# Patient Record
Sex: MALE | Race: WHITE | Employment: FULL TIME | URBAN - METROPOLITAN AREA
[De-identification: names, ages, dates, MRNs, and addresses within clinical notes are randomized per-mention and may not be internally consistent; named-entity substitution may affect disease eponyms.]

---

## 2017-08-20 ENCOUNTER — HOSPITAL ENCOUNTER (EMERGENCY)
Age: 22
Discharge: HOME | End: 2017-08-20
Attending: INTERNAL MEDICINE
Payer: OTHER GOVERNMENT

## 2017-08-20 ENCOUNTER — APPOINTMENT (OUTPATIENT)
Dept: GENERAL RADIOLOGY | Age: 22
End: 2017-08-20
Attending: PHYSICIAN ASSISTANT
Payer: OTHER GOVERNMENT

## 2017-08-20 VITALS
SYSTOLIC BLOOD PRESSURE: 125 MMHG | BODY MASS INDEX: 19.88 KG/M2 | HEIGHT: 73 IN | TEMPERATURE: 98 F | DIASTOLIC BLOOD PRESSURE: 80 MMHG | OXYGEN SATURATION: 96 % | HEART RATE: 74 BPM | WEIGHT: 150 LBS | RESPIRATION RATE: 16 BRPM

## 2017-08-20 DIAGNOSIS — S61.212A LACERATION OF RIGHT MIDDLE FINGER W/O FOREIGN BODY W/O DAMAGE TO NAIL, INITIAL ENCOUNTER: Primary | ICD-10-CM

## 2017-08-20 PROCEDURE — 77030031132 HC SUT NYL COVD -A

## 2017-08-20 PROCEDURE — 99282 EMERGENCY DEPT VISIT SF MDM: CPT

## 2017-08-20 PROCEDURE — 73140 X-RAY EXAM OF FINGER(S): CPT

## 2017-08-20 PROCEDURE — 77030018836 HC SOL IRR NACL ICUM -A

## 2017-08-20 PROCEDURE — 75810000293 HC SIMP/SUPERF WND  RPR

## 2017-08-20 RX ORDER — IBUPROFEN 600 MG/1
600 TABLET ORAL
Qty: 20 TAB | Refills: 0 | Status: SHIPPED | OUTPATIENT
Start: 2017-08-20

## 2017-08-20 NOTE — ED PROVIDER NOTES
HPI Comments: 10:42 AM  Gail Roy is a 25 y.o. male presenting to the ED C/O right hand, third digit laceration onset 11 hours ago. States he cut the finger while trying to fix the trunk of his car. Pt reports cleaning of laceration with water. Pt does not know when his last tetanus shot was but believes it was within the last 5 years but not sure. Bleeding intermittent since injury. Pt denies and any other symptoms or complaints at this time. The history is provided by the patient. No  was used. History reviewed. No pertinent past medical history. History reviewed. No pertinent surgical history. History reviewed. No pertinent family history. Social History     Social History    Marital status: SINGLE     Spouse name: N/A    Number of children: N/A    Years of education: N/A     Occupational History    Not on file. Social History Main Topics    Smoking status: Not on file    Smokeless tobacco: Not on file    Alcohol use Not on file    Drug use: Not on file    Sexual activity: Not on file     Other Topics Concern    Not on file     Social History Narrative    No narrative on file         ALLERGIES: Review of patient's allergies indicates no known allergies. Review of Systems   Constitutional: Negative for activity change. Musculoskeletal: Negative for arthralgias and joint swelling. Skin: Positive for wound (right hand third digit laceration). Neurological: Negative for weakness and numbness. Hematological: Does not bruise/bleed easily. Vitals:    08/20/17 1042   BP: 125/80   Pulse: 74   Resp: 16   Temp: 98 °F (36.7 °C)   SpO2: 96%   Weight: 68 kg (150 lb)   Height: 6' 1\" (1.854 m)            Physical Exam   Constitutional: He appears well-developed and well-nourished. No distress.  male in NAD. Alert. Appears comfortable. HENT:   Head: Normocephalic and atraumatic.    Cardiovascular: Normal rate, regular rhythm, normal heart sounds and intact distal pulses. Pulmonary/Chest: Effort normal and breath sounds normal. No respiratory distress. He has no wheezes. He has no rales. Musculoskeletal:        Right hand: He exhibits tenderness and laceration. He exhibits normal range of motion and normal capillary refill. Normal sensation noted. Normal strength noted. Hands:  Skin: He is not diaphoretic. Nursing note and vitals reviewed. RESULTS:    PULSE OXIMETRY NOTE:  Pulse-ox is 100% on RA. XR 3RD FINGER RT MIN 2 V    (Results Pending)      11:20 AM  RADIOLOGY FINDINGS  3RD finger RT X-ray shows soft tissue defect of the ulnar aspect of right 3RD finger. No radiopaque, foreign body or osseous involvement. Pending review by Radiologist  Recorded by Negin Montelongo ED Scribe, as dictated by Ashley Goldstein PA-C      Labs Reviewed - No data to display    No results found for this or any previous visit (from the past 12 hour(s)). MDM  Number of Diagnoses or Management Options  Diagnosis management comments: Laceration to R third finger. NVI. Will image and repair. Update Tdap       Amount and/or Complexity of Data Reviewed  Tests in the radiology section of CPT®: ordered and reviewed (XR 3RD Finger RT)  Independent visualization of images, tracings, or specimens: yes (XR 3RD Finger RT)      ED Course     MEDICATIONS GIVEN:  Medications   diph,Pertuss(AC),Tet Vac-PF (BOOSTRIX) suspension 0.5 mL (not administered)       Wound Repair  Date/Time: 8/20/2017 10:54 AM  Performed by: [id number] provider: Dr. Davin Ram  Preparation: sterile field established  Pre-procedure re-eval: Immediately prior to the procedure, the patient was reevaluated and found suitable for the planned procedure and any planned medications.   Location details: right long finger  Wound length:2.5 cm or less  Anesthesia: digital block    Anesthesia:  Local Anesthetic: lidocaine 1% without epinephrine  Anesthetic total: 5 mL  Foreign bodies: no foreign bodies  Irrigation solution: saline  Irrigation method: syringe  Debridement: none  Skin closure: 5-0 nylon  Number of sutures: 3  Technique: simple  Approximation: close  Dressing: 4x4  Patient tolerance: Patient tolerated the procedure well with no immediate complications  My total time at bedside, performing this procedure was 1-15 minutes. PROGRESS NOTE:  10:42 AM  Initial assessment performed. Recorded by Kasia Nunez ED Scribe, as dictated by Filipe Allen PA-C. Successful suture repair. NVI with FROM. 7 days for removal. Reviewed proper wound care. Reasons to RTED discussed with pt. All questions answered. Pt feels comfortable going home at this time. Pt expressed understanding and she agrees with plan. Discharge Note:  11:54 AM  Raúl Marcano results have been reviewed with him and/or his family. He has been counseled regarding his diagnosis, treatment, and plan. He verbally conveys understanding and agreement of the signs, symptoms, diagnosis, treatment and prognosis and additionally agrees to follow up as discussed. He also agrees with the care-plan and conveys that all of his questions have been answered. I have also provided discharge instructions for him that include: educational information regarding the diagnosis and treatment, and a list of reasons why He would want to return to the ED prior to his follow-up appointment, should his condition change. Proper ED utilization discussed with the patient. CLINICAL IMPRESSION    1. Laceration of right middle finger w/o foreign body w/o damage to nail, initial encounter        After visit plan:  D/c home    There are no discharge medications for this patient.       Follow-up Information     Follow up With Details Comments Contact Info    [facility] 7 [facility] For suture removal [address] [facility]    [address] in 7 days For primary care follow up and suture removal [address] [facility] [address]  [phone]          This note is prepared by Lupillo Handley, acting as Scribe for Osorio Campos PA-C. Osorio Campos PA-C: The scribe's documentation has been prepared under my direction and personally reviewed by me in its entirety. I confirm that the note above accurately reflects all work, treatment, procedures, and medical decision making performed by me.

## 2017-08-20 NOTE — ED TRIAGE NOTES
Pt states cut his finger right middle finger trying to fix the trunk on his car  Occurred approx 12 Midnight last night

## 2018-01-28 ENCOUNTER — HOSPITAL ENCOUNTER (EMERGENCY)
Age: 23
Discharge: HOME | End: 2018-01-28
Attending: EMERGENCY MEDICINE | Admitting: EMERGENCY MEDICINE
Payer: OTHER GOVERNMENT

## 2018-01-28 VITALS
RESPIRATION RATE: 16 BRPM | BODY MASS INDEX: 22.53 KG/M2 | HEART RATE: 67 BPM | DIASTOLIC BLOOD PRESSURE: 66 MMHG | OXYGEN SATURATION: 100 % | SYSTOLIC BLOOD PRESSURE: 144 MMHG | WEIGHT: 170 LBS | HEIGHT: 73 IN | TEMPERATURE: 97.7 F

## 2018-01-28 DIAGNOSIS — B35.6 TINEA CRURIS: Primary | ICD-10-CM

## 2018-01-28 PROCEDURE — 99281 EMR DPT VST MAYX REQ PHY/QHP: CPT

## 2018-01-28 PROCEDURE — 86592 SYPHILIS TEST NON-TREP QUAL: CPT | Performed by: PHYSICIAN ASSISTANT

## 2018-01-28 RX ORDER — PRENATAL VIT 91/IRON/FOLIC/DHA 28-975-200
COMBINATION PACKAGE (EA) ORAL 2 TIMES DAILY
Qty: 30 G | Refills: 0 | Status: SHIPPED | OUTPATIENT
Start: 2018-01-28

## 2018-01-28 NOTE — ED PROVIDER NOTES
EMERGENCY DEPARTMENT HISTORY AND PHYSICAL EXAM    Date: 1/28/2018  Patient Name: Fawn Crabtree    History of Presenting Illness     Chief Complaint   Patient presents with    Rash         History Provided By: Patient    Chief Complaint: rash  Duration: 1 Weeks  Timing:  Constant  Location: right groin and thigh area  Quality: Burning  Severity: Moderate  Modifying Factors: none  Associated Symptoms: denies any other associated signs or symptoms    Additional History (Context):   2:44 PM   Fawn Crabtree is a 23 y.o. male with no significant PMHX who presents to the emergency department C/O rash. No associates sxs. Pt reports he has had a rash to his right groin area and right thigh for 1 week now. Endorses new sexual partners. PMHx includes herniated disc in lower back. Pt denies possibility of princess something, penile discharge, penile pain, testicular pain, scrotal swelling, and any other sxs or complaints. PCP: Lindsey Hadley MD    Current Outpatient Prescriptions   Medication Sig Dispense Refill    terbinafine HCl (LAMISIL) 1 % topical cream Apply  to affected area two (2) times a day. Apply thin film to area. May take 3-4 weeks for complete resolution 30 g 0    ibuprofen (MOTRIN) 600 mg tablet Take 1 Tab by mouth every six (6) hours as needed for Pain. Take with food. 20 Tab 0       Past History     Past Medical History:  Past Medical History:   Diagnosis Date    Ill-defined condition     herniated disc in lower back       Past Surgical History:  History reviewed. No pertinent surgical history. Family History:  History reviewed. No pertinent family history. Social History:  Social History   Substance Use Topics    Smoking status: Current Every Day Smoker     Packs/day: 1.00    Smokeless tobacco: Never Used    Alcohol use Yes       Allergies:  No Known Allergies      Review of Systems   Review of Systems   Constitutional: Negative for chills and fever.    Gastrointestinal: Negative for nausea and vomiting. Genitourinary: Negative for dysuria, flank pain and frequency. Skin: Positive for rash (right groin and thigh area). Neurological: Negative for light-headedness. Hematological: Negative for adenopathy. All other systems reviewed and are negative. Physical Exam     Vitals:    01/28/18 1430   BP: 144/66   Pulse: 67   Resp: 16   Temp: 97.7 °F (36.5 °C)   SpO2: 100%   Weight: 77.1 kg (170 lb)   Height: 6' 1\" (1.854 m)     Physical Exam   Constitutional: He is oriented to person, place, and time. He appears well-developed and well-nourished. No distress.  male in NAD. Alert. Appears comfortable. HENT:   Head: Normocephalic and atraumatic. Right Ear: External ear normal.   Left Ear: External ear normal.   Nose: Nose normal.   Mouth/Throat: No oral lesions. Eyes: Conjunctivae are normal. Right eye exhibits no discharge. Left eye exhibits no discharge. Neck: Normal range of motion. Cardiovascular: Normal rate, regular rhythm, normal heart sounds and intact distal pulses. Exam reveals no gallop and no friction rub. No murmur heard. Pulmonary/Chest: Effort normal and breath sounds normal. No accessory muscle usage. No tachypnea. No respiratory distress. He has no decreased breath sounds. He has no wheezes. He has no rhonchi. He has no rales. Musculoskeletal: Normal range of motion. Neurological: He is alert and oriented to person, place, and time. Skin: Skin is warm and dry. He is not diaphoretic. There is erythema. Psychiatric: He has a normal mood and affect. Judgment normal.   Nursing note and vitals reviewed. Diagnostic Study Results     Labs -   No results found for this or any previous visit (from the past 12 hour(s)).     Radiologic Studies -   No orders to display     CT Results  (Last 48 hours)    None        CXR Results  (Last 48 hours)    None          Medications given in the ED-  Medications - No data to display      Medical Decision Making I am the first provider for this patient. I reviewed the vital signs, available nursing notes, past medical history, past surgical history, family history and social history. Vital Signs-Reviewed the patient's vital signs. Pulse Oximetry Analysis - 100% on RA     Records Reviewed: Nursing Notes    Provider Notes (Medical Decision Making): tinea, dermatitis, syphilis. Doubt HSV or HPV    Procedures:  Procedures    ED Course:   2:44 PM  Initial assessment performed. The patients presenting problems have been discussed, and they are in agreement with the care plan formulated and outlined with them. I have encouraged them to ask questions as they arise throughout their visit. Diagnosis and Disposition     Suspect tinea. Will await RPR. Reasons to RTED discussed with pt. All questions answered. Pt feels comfortable going home at this time. Pt expressed understanding and he agrees with plan. DISCHARGE NOTE:  2:53 PM  Jsjaymearisteo De Andamarcus  results have been reviewed with him. He has been counseled regarding his diagnosis, treatment, and plan. He verbally conveys understanding and agreement of the signs, symptoms, diagnosis, treatment and prognosis and additionally agrees to follow up as discussed. He also agrees with the care-plan and conveys that all of his questions have been answered. I have also provided discharge instructions for him that include: educational information regarding their diagnosis and treatment, and list of reasons why they would want to return to the ED prior to their follow-up appointment, should his condition change. He has been provided with education for proper emergency department utilization. CLINICAL IMPRESSION:    1. Tinea cruris        PLAN:  1. D/C Home  2. Discharge Medication List as of 1/28/2018  2:54 PM      START taking these medications    Details   terbinafine HCl (LAMISIL) 1 % topical cream Apply  to affected area two (2) times a day. Apply thin film to area. May take 3-4 weeks for complete resolution, Print, Disp-30 g, R-0         CONTINUE these medications which have NOT CHANGED    Details   ibuprofen (MOTRIN) 600 mg tablet Take 1 Tab by mouth every six (6) hours as needed for Pain. Take with food. , Print, Disp-20 Tab, R-0           3. Follow-up Information     Follow up With Details Comments Contact Info    Nelson Flanagan MD Schedule an appointment as soon as possible for a visit in 2 days For follow up with [facility] EMERGENCY DEPT Go to As needed, if symptoms worsen [address] [address]  [phone]        _______________________________    SCRIBE ATTESTATION:  This note is prepared by Arcadio Hamilton, acting as Scribe for Seth Cronin PA-C.    PROVIDER ATTESTATION:  Seth Cronin PA-C: The scribe's documentation has been prepared under my direction and personally reviewed by me in its entirety.  I confirm that the note above accurately reflects all work, treatment, procedures, and medical decision making performed by me.      _______________________________

## 2018-01-28 NOTE — ED TRIAGE NOTES
Amb into ed w/ reports painful rash to groin area now x 1 week - non itchy. Denies any penile discharge - reports a handful of non protected new sexual partners recently.

## 2018-01-28 NOTE — DISCHARGE INSTRUCTIONS
Jock Itch: Care Instructions  Your Care Instructions  Jock itch is a fungal infection of the groin. The fungus that causes jock itch lives on your skin. It often affects male athletes, but anyone can get jock itch. You may get an itchy rash on your inner thighs and rear end (buttocks). It spreads and starts to itch when you sweat or are in steamy showers or locker rooms. Jock itch should end soon if you keep your skin dry after you clean it. You can treat jock itch at home with antifungal creams and powders that you can buy without a prescription. Follow-up care is a key part of your treatment and safety. Be sure to make and go to all appointments, and call your doctor if you are having problems. It's also a good idea to know your test results and keep a list of the medicines you take. How can you care for yourself at home? · Wash the rash with soap and water. · Wet a soft cloth with cool water alone or mixed with baking soda or essential oils such as lavender or eliazar. Put the cool compress on the skin to relieve itching. · If you have large areas of blisters or sores, use compresses such as those made with Burow's solution (available without a prescription) to soothe and dry out the blisters. · Spread antifungal cream or powder over, and beyond the edge of, the rash. Follow the directions on the package. · If your doctor prescribed medicine, take it exactly as directed. Call your doctor if you have any problems with your medicine. · Try not to scratch the rash. · Shower or bathe daily and after you exercise. · Keep your skin dry as much as possible to allow it to heal.  · Until your jock itch is cured, wear loose-fitting cotton clothing. Avoid tight underwear, pants, and panty hose. · Wash your supporters and shorts after every wearing. · Do not share clothing, sports equipment, towels, or sheets to avoid spreading the fungi to other people.   To prevent jock itch  · Put on socks before you put on underwear if you have athlete's foot. This action helps prevent the fungus on your feet from spreading to your groin. · Wash your workout clothes, underwear, socks, and towels after each use. · Keep your groin, inner thighs, and buttocks clean and dry, especially after you exercise and shower. · Use a powder on your groin, inner thighs, and buttocks to help keep these areas dry. · Do not borrow or lend clothing, sports equipment, towels, or sheets. · Wear slippers or sandals in locker rooms, showers, and public bathing areas. When should you call for help? Call your doctor now or seek immediate medical care if:  ? · You have signs of infection, such as:  ¨ Increased pain, swelling, warmth, or redness. ¨ Red streaks leading from the rash. ¨ Pus draining from the rash. ¨ A fever. ? Watch closely for changes in your health, and be sure to contact your doctor if:  ? · You do not get better as expected. Where can you learn more? Go to http://taylor-cheryl.info/. Enter G303 in the search box to learn more about \"Jock Itch: Care Instructions. \"  Current as of: October 13, 2016  Content Version: 11.4  © 6383-8466 [employer]. Care instructions adapted under license by [employer] (which disclaims liability or warranty for this information). If you have questions about a medical condition or this instruction, always ask your healthcare professional. [place] [address] any warranty or liability for your use of this information.

## 2018-01-29 LAB — RPR SER QL: NONREACTIVE
